# Patient Record
Sex: FEMALE | URBAN - METROPOLITAN AREA
[De-identification: names, ages, dates, MRNs, and addresses within clinical notes are randomized per-mention and may not be internally consistent; named-entity substitution may affect disease eponyms.]

---

## 2021-07-27 ENCOUNTER — APPOINTMENT (RX ONLY)
Dept: URBAN - METROPOLITAN AREA CLINIC 95 | Facility: CLINIC | Age: 36
Setting detail: DERMATOLOGY
End: 2021-07-27

## 2021-07-27 DIAGNOSIS — Z00.8 ENCOUNTER FOR OTHER GENERAL EXAMINATION: ICD-10-CM

## 2021-07-27 DIAGNOSIS — L81.0 POSTINFLAMMATORY HYPERPIGMENTATION: ICD-10-CM

## 2021-07-27 DIAGNOSIS — L82.1 OTHER SEBORRHEIC KERATOSIS: ICD-10-CM

## 2021-07-27 DIAGNOSIS — L91.8 OTHER HYPERTROPHIC DISORDERS OF THE SKIN: ICD-10-CM

## 2021-07-27 PROCEDURE — ? PHE RELATED SUPPLIES PROVIDED/DISPENSED

## 2021-07-27 PROCEDURE — 99203 OFFICE O/P NEW LOW 30 MIN: CPT

## 2021-07-27 PROCEDURE — ? SKIN TAG REMOVAL (COSMETIC)

## 2021-07-27 PROCEDURE — ? COUNSELING

## 2021-07-27 PROCEDURE — 99072 ADDL SUPL MATRL&STAF TM PHE: CPT

## 2021-07-27 PROCEDURE — ? TREATMENT REGIMEN

## 2021-07-27 ASSESSMENT — LOCATION DETAILED DESCRIPTION DERM
LOCATION DETAILED: LEFT PROXIMAL PRETIBIAL REGION
LOCATION DETAILED: RIGHT CLAVICULAR SKIN
LOCATION DETAILED: RIGHT LATERAL MALAR CHEEK
LOCATION DETAILED: RIGHT PROXIMAL PRETIBIAL REGION
LOCATION DETAILED: RIGHT SUPERIOR LATERAL MALAR CHEEK
LOCATION DETAILED: LEFT AXILLARY VAULT
LOCATION DETAILED: RIGHT INFERIOR LATERAL NECK
LOCATION DETAILED: RIGHT AXILLARY VAULT

## 2021-07-27 ASSESSMENT — LOCATION SIMPLE DESCRIPTION DERM
LOCATION SIMPLE: RIGHT AXILLARY VAULT
LOCATION SIMPLE: LEFT AXILLARY VAULT
LOCATION SIMPLE: RIGHT CHEEK
LOCATION SIMPLE: RIGHT PRETIBIAL REGION
LOCATION SIMPLE: RIGHT ANTERIOR NECK
LOCATION SIMPLE: RIGHT CLAVICULAR SKIN
LOCATION SIMPLE: LEFT PRETIBIAL REGION

## 2021-07-27 ASSESSMENT — LOCATION ZONE DERM
LOCATION ZONE: TRUNK
LOCATION ZONE: AXILLAE
LOCATION ZONE: NECK
LOCATION ZONE: FACE
LOCATION ZONE: LEG

## 2021-07-27 NOTE — PROCEDURE: SKIN TAG REMOVAL (COSMETIC)
Detail Level: Detailed
Removed With: gradle excision
Consent: Written consent obtained and the risks of skin tag removal was reviewed with the patient including but not limited to bleeding, pigmentary change, infection, pain, and remote possibility of scarring.
Anesthesia Volume In Cc: 3
Anesthesia Type: 1% lidocaine with epinephrine

## 2024-08-01 NOTE — PROCEDURE: MIPS QUALITY
Assessment     ASSESSMENT/PLAN:      Patient Instructions   IFG/Metabolic syndrome: Pt is class 1 obesity with metabolic syndrome.  Despite strict diet and staying active her blood sugar elevated and weight loss is negligible. I believe she would be a good candidate for semaglutide 0.25 weekly.       Signed by: Xochitl Goyal DO       FUTURE DIRECTION:   []    Subjective   SUBJECTIVE:     HPI : Patient is a 77 y.o. female who presents today for the following:     Anxiety  Has tried Wellbutrin in the past however experienced side effects so that was discontinued  Currently on gabapentin 300 mg PM 100mg AM   Clonazepam 0.5mg daily   She is being seen by psychiatrist as well     IFG/ Metabolic syndrome  Pt has been on a strict diet over the past 3 months and adhering to staying active. Despite her efforts she was only able to lose 1lb.      HTN  Controlled on olmesartan 40 mg daily    Diabetes insipidus   Currently managed with desmopressin 0.1mg daily     HLD  Has been on statin and zetia but caused back ache     OA   Prinamry OA in bilateral lower extremity managed with collagen supplements     Cataracts   S/P surgery on right eye on 4/26/24, plans to have left eye procedure soon    History of RCC  Following urology, yearly CTAP neg for reoccurrence     Care Team:   Urology: Dr. Way  Psych:          Review of Systems    Past Medical History:   Diagnosis Date    Personal history of other diseases of the circulatory system     History of hypertension        Past Surgical History:   Procedure Laterality Date    CATARACT EXTRACTION Right 2024    CHOLECYSTECTOMY      HYSTERECTOMY  02/08/2017    Hysterectomy    NEPHRECTOMY  07/20/2017    Nephrectomy Left    OOPHORECTOMY      TUBAL LIGATION          Current Outpatient Medications   Medication Instructions    ascorbic acid/collagen hydr (COLLAGEN SKIN RENEWAL ORAL) oral, Daily    buPROPion (Wellbutrin) 75 mg tablet     clonazePAM (KLONOPIN) 0.5 mg, oral, 
2 times daily PRN    desmopressin (DDAVP) 0.25 mg, oral, Daily    fexofenadine (Allegra) 180 mg tablet oral, Daily    gabapentin (Neurontin) 300 mg capsule 1 capsule, oral, Nightly    gabapentin (NEURONTIN) 100 mg, oral, Daily    mecobalamin, vitamin B12, 1,000 mcg tablet,chewable oral, Daily    multivitamin with minerals (multivitamin with folic acid) tablet 1 tablet, oral, Daily    olmesartan (BENICAR) 40 mg, oral, Daily    semaglutide 0.25 mg, subcutaneous, Once Weekly        Allergies   Allergen Reactions    Ezetimibe Other    Metoprolol Other and Unknown    Mold Unknown    Omnicef [Cefdinir] Hives    Penicillins Hives    Statins-Hmg-Coa Reductase Inhibitors Other    Cephalosporins Hives, Rash and Unknown    Codeine Hives and Rash        Social History     Socioeconomic History    Marital status:      Spouse name: Not on file    Number of children: Not on file    Years of education: Not on file    Highest education level: Not on file   Occupational History    Not on file   Tobacco Use    Smoking status: Never    Smokeless tobacco: Never   Vaping Use    Vaping status: Never Used   Substance and Sexual Activity    Alcohol use: Never    Drug use: Never    Sexual activity: Not Currently   Other Topics Concern    Not on file   Social History Narrative    Not on file     Social Determinants of Health     Financial Resource Strain: Not on file   Food Insecurity: Not on file   Transportation Needs: Not on file   Physical Activity: Not on file   Stress: Not on file   Social Connections: Not on file   Intimate Partner Violence: Not on file   Housing Stability: Not on file        Family History   Problem Relation Name Age of Onset    Prostate cancer Father      Hypertension Father      Angina Father      Hypertension Sister      Hypertension Brother      Diabetes Father's Brother      Stroke Maternal Grandmother      Colon cancer Maternal Grandfather      Breast cancer Other maternal niece         Objective 
"    OBJECTIVE:     Vitals:    08/01/24 0915   BP: 134/82   Pulse: 73   Temp: 36.4 °C (97.6 °F)   SpO2: 97%   Weight: 82.1 kg (181 lb)   Height: 1.651 m (5' 5\")        Physical Exam  Constitutional:       Appearance: Normal appearance.   HENT:      Head: Normocephalic.   Pulmonary:      Effort: Pulmonary effort is normal.   Musculoskeletal:      Cervical back: Normal range of motion.   Neurological:      Mental Status: She is alert.   Psychiatric:         Mood and Affect: Mood is anxious.             "
Detail Level: Detailed
Quality 130: Documentation Of Current Medications In The Medical Record: Current Medications Documented